# Patient Record
Sex: MALE | Race: WHITE | NOT HISPANIC OR LATINO | Employment: FULL TIME | URBAN - METROPOLITAN AREA
[De-identification: names, ages, dates, MRNs, and addresses within clinical notes are randomized per-mention and may not be internally consistent; named-entity substitution may affect disease eponyms.]

---

## 2017-03-09 ENCOUNTER — TELEPHONE (OUTPATIENT)
Dept: FAMILY MEDICINE | Facility: CLINIC | Age: 37
End: 2017-03-09

## 2017-03-09 NOTE — TELEPHONE ENCOUNTER
Reason for call:  Patient reporting a symptom    Symptom or request: Abdominal pain.    Duration (how long have symptoms been present): NA    Have you been treated for this before? No    Additional comments: Patient is having sharp abdominal pain.     Phone Number patient can be reached at:  Home number on file [phone] (home) or Cell number on file:    Telephone Information:   Mobile [phone]       Best Time:  any    Can we leave a detailed message on this number:  NO    Call taken on 3/9/2017 at 10:42 AM by Marlee Alvarado

## 2017-03-09 NOTE — TELEPHONE ENCOUNTER
Crow Castrejon is a 36 year old male  who calls with abdominal pain.    NURSING ASSESSMENT:  The pain began 3 days ago.  Lasts about a few seconds each time. About 3-4 episodes a day   Pain scale (0-10):  LMP  was  n/a.  The pain is described as sharp and is located LLQ, which is without radiation.  Symptom associated with the abdominal pain: none.  Patient has reports that he had an abd hernia as baby (unsure of specifics) but has a scar on his abd from it (surgery?)  Pain is aggravated by coughing or certain movements, and relieved by certain positions/movements.  Allergies: No Known Allergies    MEDICATIONS:   Taking medication(s) as prescribed? N/A  Taking over the counter medication(s)? N/A  Any medication side effects? Not Applicable    Any barriers to taking medication(s) as prescribed?  N/A  Medication(s) improving/managing symptoms?  N/A  Medication reconciliation completed: N/A    NURSING PLAN: Nursing advice to patient to be seen for further eval. Has already made an appointment for tomorrow. Unable to find open appts for today for clinic locations requested. He stated that he will just keep his appointment for tomorrow and go to  if symptoms worsens and unable to wait    RECOMMENDED DISPOSITION:  see above  Will comply with recommendation: Yes  If further questions/concerns or if symptoms do not improve, worsen or new symptoms develop, call your PCP or [facility] as soon as possible.    Guideline used:  Telephone Triage Protocols for Nurses, Fourth Edition, Charlotte Espinoza RN

## 2017-03-10 ENCOUNTER — OFFICE VISIT (OUTPATIENT)
Dept: FAMILY MEDICINE | Facility: CLINIC | Age: 37
End: 2017-03-10
Payer: COMMERCIAL

## 2017-03-10 VITALS
OXYGEN SATURATION: 99 % | WEIGHT: 200.4 LBS | DIASTOLIC BLOOD PRESSURE: 78 MMHG | HEART RATE: 69 BPM | TEMPERATURE: 98.2 F | BODY MASS INDEX: 24.72 KG/M2 | SYSTOLIC BLOOD PRESSURE: 129 MMHG

## 2017-03-10 DIAGNOSIS — S39.011A STRAIN OF ABDOMINAL MUSCLE, INITIAL ENCOUNTER: ICD-10-CM

## 2017-03-10 DIAGNOSIS — R10.32 LLQ ABDOMINAL PAIN: Primary | ICD-10-CM

## 2017-03-10 LAB
BASOPHILS # BLD AUTO: 0 10E9/L (ref 0–0.2)
BASOPHILS NFR BLD AUTO: 0.4 %
DIFFERENTIAL METHOD BLD: NORMAL
EOSINOPHIL # BLD AUTO: 0.1 10E9/L (ref 0–0.7)
EOSINOPHIL NFR BLD AUTO: 2.4 %
ERYTHROCYTE [DISTWIDTH] IN BLOOD BY AUTOMATED COUNT: 12.4 % (ref 10–15)
HCT VFR BLD AUTO: 45.6 % (ref 40–53)
HGB BLD-MCNC: 14.9 G/DL (ref 13.3–17.7)
LYMPHOCYTES # BLD AUTO: 1.7 10E9/L (ref 0.8–5.3)
LYMPHOCYTES NFR BLD AUTO: 37.1 %
MCH RBC QN AUTO: 29 PG (ref 26.5–33)
MCHC RBC AUTO-ENTMCNC: 32.7 G/DL (ref 31.5–36.5)
MCV RBC AUTO: 89 FL (ref 78–100)
MONOCYTES # BLD AUTO: 0.6 10E9/L (ref 0–1.3)
MONOCYTES NFR BLD AUTO: 12.6 %
NEUTROPHILS # BLD AUTO: 2.2 10E9/L (ref 1.6–8.3)
NEUTROPHILS NFR BLD AUTO: 47.5 %
PLATELET # BLD AUTO: 177 10E9/L (ref 150–450)
RBC # BLD AUTO: 5.13 10E12/L (ref 4.4–5.9)
WBC # BLD AUTO: 4.5 10E9/L (ref 4–11)

## 2017-03-10 PROCEDURE — 36415 COLL VENOUS BLD VENIPUNCTURE: CPT | Performed by: NURSE PRACTITIONER

## 2017-03-10 PROCEDURE — 85025 COMPLETE CBC W/AUTO DIFF WBC: CPT | Performed by: NURSE PRACTITIONER

## 2017-03-10 PROCEDURE — 99213 OFFICE O/P EST LOW 20 MIN: CPT | Performed by: NURSE PRACTITIONER

## 2017-03-10 RX ORDER — CYCLOBENZAPRINE HCL 10 MG
5-10 TABLET ORAL 3 TIMES DAILY PRN
Qty: 30 TABLET | Refills: 0 | Status: SHIPPED | OUTPATIENT
Start: 2017-03-10 | End: 2018-08-20

## 2017-03-10 NOTE — PATIENT INSTRUCTIONS
Try alternating Tylenol and ibuprofen to see if that improves the symptoms. Additionally, you can try the muscle relaxers to see if this improves the pain. If it does, you can hold off on the ultrasound. If it does not, have the ultrasound done.  I will let you know your lab results and imaging results.  Follow up if your symptoms persist or worsen.       Groin Strain (Adult)    A groin strain is a stretching or partial tearing of the muscle in the lower abdomen or upper thigh. This may happen because of too much coughing, heavy lifting, or active sports. The pain may last for several days to weeks, depending on how bad the stretch or tear is. It will generally get better with rest, ice, and anti-inflammatory medicines.  A groin strain can lead to a groin hernia. This is also called an inguinal hernia. A hernia is a complete tear of the abdominal muscle. This allows fat or the intestines to bulge out and create a visible bump just above the thigh crease. This is a more serious problem and may need surgery to repair it. When you lie down, the bump should get smaller or disappear completely. If it doesn t, and you are not able to flatten it with your hand, you need medical attention right away.  Home care    Avoid heavy lifting, straining, or any activities that cause groin pain.    You may use over-the-counter pain medicine to control pain, unless another pain medicine was prescribed. If you have chronic liver or kidney disease or ever had a stomach ulcer or GI bleeding, talk with your healthcare provider before using these medicines.  Follow-up care  Follow up with your healthcare provider, or as advised. Make an appointment with your healthcare provider if you develop a bump in the area of the groin strain.  When to seek medical advice  Call your healthcare provider right away if any of these occur:    Increasing pain in the area of the groin strain    Tender bump just above the groin crease that does not flatten  when you lie down or press on it    Overall abdominal swelling or pain    Fever of 100.4 F (38 C) or above lasting for 24 to 48 hours    Repeated vomiting    Pain that moves to the lower right abdomen, just below the waistline, or spreads to the back    1452-2276 [employer]. [address] [place] [address]. All rights reserved. This information is not intended as a substitute for professional medical care. Always follow your healthcare professional's instructions.

## 2017-03-10 NOTE — NURSING NOTE
"Chief Complaint   Patient presents with     Abdominal Pain       Initial /78  Pulse 69  Temp 98.2  F (36.8  C) (Oral)  Wt 200 lb 6.4 oz (90.9 kg)  SpO2 99%  BMI 24.72 kg/m2 Estimated body mass index is 24.72 kg/(m^2) as calculated from the following:    Height as of 7/8/16: 6' 3.5\" (1.918 m).    Weight as of this encounter: 200 lb 6.4 oz (90.9 kg).  Medication Reconciliation: complete     Venancio Hoffman CMA    "

## 2017-03-10 NOTE — PROGRESS NOTES
SUBJECTIVE:                                                    Crow Castrejon is a 36 year old male who presents to clinic today for the following health issues:      Abdominal Pain      Duration: x4-5 days    Description (location/character/radiation): Lower Left abdomen       Associated flank pain: none    Intensity:  Moderate, intermittent    Accompanying signs and symptoms: Was playing volleyball Monday, does not know of injury, pain noticed Tuesday am.       Fever/Chills: no        Gas/Bloating: no        Nausea/vomitting: no        Diarrhea: no        Dysuria or Hematuria: no     History (previous similar pain/trauma/previous testing): none    Precipitating or alleviating factors:       Pain worse with eating/BM/urination: none       Pain relieved by BM: no ; no change in BMs, not family hx of chrons, colitis, colon ca       Pain worse with coughing , lifting.     Therapies tried and outcome: None    LMP:  not applicable       Nathaniel did have a hernia when he was a baby.    Problem list and histories reviewed & adjusted, as indicated.  Additional history: as documented    Patient Active Problem List   Diagnosis     CARDIOVASCULAR SCREENING; LDL GOAL LESS THAN 160     Snoring     DIAMOND (obstructive sleep apnea)-moderate (AHI 24)     Past Surgical History   Procedure Laterality Date     Surgical history of -   2010     lipoma excision neck area     Fracture tx, finger/hand  1994     Fracture TX Finger,Hand       Social History   Substance Use Topics     Smoking status: Former Smoker     Quit date: 6/1/2010     Smokeless tobacco: Never Used     Alcohol use Yes      Comment: occasional     Family History   Problem Relation Age of Onset     Thyroid Disease Father      graves Disease     Breast Cancer Paternal Grandmother          Current Outpatient Prescriptions   Medication Sig Dispense Refill     cyclobenzaprine (FLEXERIL) 10 MG tablet Take 0.5-1 tablets (5-10 mg) by mouth 3 times daily as needed for muscle  spasms 30 tablet 0     ORDER FOR [facility] Respironics REMSTAR 60 Series Auto CPAP 6cm H2O, Howell Fx mask       No Known Allergies  BP Readings from Last 3 Encounters:   03/10/17 129/78   08/23/16 118/70   07/08/16 136/82    Wt Readings from Last 3 Encounters:   03/10/17 200 lb 6.4 oz (90.9 kg)   08/23/16 194 lb (88 kg)   07/08/16 195 lb 6.4 oz (88.6 kg)            Labs reviewed in EPIC    Reviewed and updated as needed this visit by clinical staff  Tobacco  Allergies  Meds  Med Hx  Surg Hx  Fam Hx  Soc Hx      Reviewed and updated as needed this visit by Provider         ROS:  Constitutional, HEENT, cardiovascular, pulmonary, GI, , musculoskeletal, neuro, skin, endocrine and psych systems are negative, except as otherwise noted.    OBJECTIVE:                                                    /78  Pulse 69  Temp 98.2  F (36.8  C) (Oral)  Wt 200 lb 6.4 oz (90.9 kg)  SpO2 99%  BMI 24.72 kg/m2  Body mass index is 24.72 kg/(m^2).  GENERAL: healthy, alert and no distress  RESP: lungs clear to auscultation - no rales, rhonchi or wheezes  CV: regular rate and rhythm, normal S1 S2, no S3 or S4, no murmur, click or rub, no peripheral edema and peripheral pulses strong  ABDOMEN: soft, no hepatosplenomegaly, no masses and bowel sounds normal  POSITIVE for LLQ tenderness with palpation  MS: no gross musculoskeletal defects noted, no edema  SKIN: no suspicious lesions or rashes  NEURO: A&O, mentation intact and speech normal    Diagnostic Test Results:  See orders     ASSESSMENT/PLAN:                                                          ICD-10-CM    1. LLQ abdominal pain R10.32 CBC with platelets and differential     US Abdomen Complete   2. Strain of abdominal muscle, initial encounter S39.011A cyclobenzaprine (FLEXERIL) 10 MG tablet       See Patient Instructions    FAUZIA Arauz  [facility]

## 2017-03-10 NOTE — MR AVS SNAPSHOT
After Visit Summary   3/10/2017    Crow Castrejon    MRN: [id number]           Patient Information     Date Of Birth          1980        Visit Information        Provider Department      3/10/2017 10:20 AM Judy Rodriguez NP [facility]        Today's Diagnoses     LLQ abdominal pain    -  1    Strain of abdominal muscle, initial encounter          Care Instructions    Try alternating Tylenol and ibuprofen to see if that improves the symptoms. Additionally, you can try the muscle relaxers to see if this improves the pain. If it does, you can hold off on the ultrasound. If it does not, have the ultrasound done.  I will let you know your lab results and imaging results.  Follow up if your symptoms persist or worsen.       Groin Strain (Adult)    A groin strain is a stretching or partial tearing of the muscle in the lower abdomen or upper thigh. This may happen because of too much coughing, heavy lifting, or active sports. The pain may last for several days to weeks, depending on how bad the stretch or tear is. It will generally get better with rest, ice, and anti-inflammatory medicines.  A groin strain can lead to a groin hernia. This is also called an inguinal hernia. A hernia is a complete tear of the abdominal muscle. This allows fat or the intestines to bulge out and create a visible bump just above the thigh crease. This is a more serious problem and may need surgery to repair it. When you lie down, the bump should get smaller or disappear completely. If it doesn t, and you are not able to flatten it with your hand, you need medical attention right away.  Home care    Avoid heavy lifting, straining, or any activities that cause groin pain.    You may use over-the-counter pain medicine to control pain, unless another pain medicine was prescribed. If you have chronic liver or kidney disease or ever had a stomach ulcer or GI bleeding, talk with your healthcare  provider before using these medicines.  Follow-up care  Follow up with your healthcare provider, or as advised. Make an appointment with your healthcare provider if you develop a bump in the area of the groin strain.  When to seek medical advice  Call your healthcare provider right away if any of these occur:    Increasing pain in the area of the groin strain    Tender bump just above the groin crease that does not flatten when you lie down or press on it    Overall abdominal swelling or pain    Fever of 100.4 F (38 C) or above lasting for 24 to 48 hours    Repeated vomiting    Pain that moves to the lower right abdomen, just below the waistline, or spreads to the back    [id number] [employer]. [address] [place] [address], [place] [address]. All rights reserved. This information is not intended as a substitute for professional medical care. Always follow your healthcare professional's instructions.              Follow-ups after your visit        Follow-up notes from your care team     Return if symptoms worsen or fail to improve.      Future tests that were ordered for you today     Open Future Orders        Priority Expected Expires Ordered    US Abdomen Complete Routine 6/8/2017 3/10/2018 3/10/2017            Who to contact     Normal or non-critical lab and imaging results will be communicated to you by [employer], letter or phone within 4 business days after the clinic has received the results. If you do not hear from us within 7 days, please contact the clinic through [employer] or phone. If you have a critical or abnormal lab result, we will notify you by phone as soon as possible.  Submit refill requests through [employer] or call your pharmacy and they will forward the refill request to us. Please allow 3 business days for your refill to be completed.          If you need to speak with a  for additional information , please call: [phone]             Additional Information About Your  Visit        MyChart Information     [employer] gives you secure access to your electronic health record. If you see a primary care provider, you can also send messages to your care team and make appointments. If you have questions, please call your primary care clinic.  If you do not have a primary care provider, please call [phone] and they will assist you.        Care EveryWhere ID     This is your Care EveryWhere ID. This could be used by other organizations to access your [facility] medical records  [id number]        Your Vitals Were     Pulse Temperature Pulse Oximetry BMI (Body Mass Index)          69 98.2  F (36.8  C) (Oral) 99% 24.72 kg/m2         Blood Pressure from Last 3 Encounters:   03/10/17 129/78   08/23/16 118/70   07/08/16 136/82    Weight from Last 3 Encounters:   03/10/17 200 lb 6.4 oz (90.9 kg)   08/23/16 194 lb (88 kg)   07/08/16 195 lb 6.4 oz (88.6 kg)              We Performed the Following     CBC with platelets and differential          Today's Medication Changes          These changes are accurate as of: 3/10/17 10:40 AM.  If you have any questions, ask your nurse or doctor.               Start taking these medicines.        Dose/Directions    cyclobenzaprine 10 MG tablet   Commonly known as:  FLEXERIL   Used for:  Strain of abdominal muscle, initial encounter   Started by:  Judy Rodriguez NP        Dose:  5-10 mg   Take 0.5-1 tablets (5-10 mg) by mouth 3 times daily as needed for muscle spasms   Quantity:  30 tablet   Refills:  0            Where to get your medicines      These medications were sent to [facility] - [address] [facility]  [address] [facility] [place] [address]     Phone:  [phone]     cyclobenzaprine 10 MG tablet                Primary Care Provider Office Phone # Fax #    Irina Andres PA-C [phone]       [facility] [address] [place] [address] DR ERIK THOMAS [id number]        Thank you!     Thank you for choosing  [facility]  for your care. Our goal is always to provide you with excellent care. Hearing back from our patients is one way we can continue to improve our services. Please take a few minutes to complete the written survey that you may receive in the mail after your visit with us. Thank you!             Your Updated Medication List - Protect others around you: Learn how to safely use, store and throw away your medicines at www.disposemymeds.org.          This list is accurate as of: 3/10/17 10:40 AM.  Always use your most recent med list.                   Brand Name Dispense Instructions for use    cyclobenzaprine 10 MG tablet    FLEXERIL    30 tablet    Take 0.5-1 tablets (5-10 mg) by mouth 3 times daily as needed for muscle spasms       order for [facility]      Respironics REMSTAR 60 Series Auto CPAP 6cm H2O, Howell Fx mask

## 2018-08-20 ENCOUNTER — OFFICE VISIT (OUTPATIENT)
Dept: SLEEP MEDICINE | Facility: CLINIC | Age: 38
End: 2018-08-20
Payer: COMMERCIAL

## 2018-08-20 VITALS
BODY MASS INDEX: 24.48 KG/M2 | WEIGHT: 201 LBS | HEART RATE: 73 BPM | HEIGHT: 76 IN | SYSTOLIC BLOOD PRESSURE: 132 MMHG | DIASTOLIC BLOOD PRESSURE: 79 MMHG | OXYGEN SATURATION: 98 %

## 2018-08-20 DIAGNOSIS — G47.33 OSA (OBSTRUCTIVE SLEEP APNEA): Primary | ICD-10-CM

## 2018-08-20 PROCEDURE — 99203 OFFICE O/P NEW LOW 30 MIN: CPT | Performed by: PHYSICIAN ASSISTANT

## 2018-08-20 NOTE — PATIENT INSTRUCTIONS

## 2018-08-20 NOTE — NURSING NOTE
"Chief Complaint   Patient presents with     Sleep Problem     consult-get new supplies       Initial /79  Pulse 73  Ht 1.93 m (6' 4\")  Wt 91.2 kg (201 lb)  SpO2 98%  BMI 24.47 kg/m2 Estimated body mass index is 24.47 kg/(m^2) as calculated from the following:    Height as of this encounter: 1.93 m (6' 4\").    Weight as of this encounter: 91.2 kg (201 lb).    Medication Reconciliation: complete    Neck circumference: 14.75 inches / 37.5 centimeters.    DME:[facility]  "

## 2018-08-20 NOTE — PROGRESS NOTES
Sleep Consultation:    Date on this visit: 8/20/2018    Crow Castrejon is sent by No ref. provider found for a sleep consultation .    Primary Physician: Irina Andres     Chief Complaint   Patient presents with     Sleep Problem     consult-get new supplies     Crow Castrejon initially presented [facility] withconcerns of snoring, witnessed apnea and non-restorative sleep. He was recommended to undergo a sleep study.    Polysomnogram 1/21/2013: (168.4 lbs)- Baseline oxygen saturation was 96.0%. Snoring was reported as loud. Lowest oxygen saturation was 85.0% The combined Apnea/Hypopnea Index was 24.3 events per hour. The REM AHI is 36.7. The supine AHI is 24.3. The RERA index was 9.5 per hour. The RDI was 33.8.     Titration Polysomnography 2/12/2013(weighs 168.4 lbs)- The patient was titrated at pressures ranging from 5.0 cm/H20 up to 6.0 cm/H20. The optimal pressure achieved was 6.0 cm/H20 with an AHI of 0.5 including REM supine.    He was diagnosed with moderate obstructive sleep apnea and prescribed CPAP 6 cm/H20. He stopped CPAP in 2014 after meeting his girlfriend who did not mind him snoring. He states he wants to start CPAP again because of return of daytime sleepiness.     He has gained ~20 lbs since his last sleep study.     Crow goes to sleep at 11:00 PM during the week. He wakes up at 6:00 AM with an alarm. He falls asleep in 5 minutes.  Crow denies difficulty falling asleep.  He wakes up 2-3 times a night for 5 minutes before falling back to sleep.  Crow wakes up to uncertain reasons.  On weekends, Crow goes to sleep at 12:00 AM.  He wakes up at 8:00 AM without an alarm. He falls asleep in 5 minutes.  Patient gets 5-6 hours of sleep per night.     Patient does watch TV in bed and does not use electronics in bed and read in bed.     Crow does not do shift work.      Crow does snore every night and snoring is loud. Patient does have a regular bed  partner. There is report of snoring.  He does have witnessed apneas. They never sleep separately.  Patient sleeps on his back and side. He denies no morning headaches, morning confusion and restless legs. Crow denies any bruxism, sleep walking, sleep talking, dream enactment, sleep paralysis, cataplexy and hypnogogic/hypnopompic hallucinations.    Crow denies difficulty breathing through his nose, claustrophobia and reflux at night.      Patient describes themself as a morning person.  He would prefer to go to sleep at 10:00 PM and wake up at 6:00 AM.  Patient's Neely Sleepiness score 4/24 inconsistent with excessive  daytime sleepiness.  He has fatigue.     Crow does not take naps.  He takes some inadvertant naps.  He denies falling asleep while driving.  Patient was counseled on the importance of driving while alert, to pull over if drowsy, or nap before getting into the vehicle if sleepy.    Allergies:    No Known Allergies    Medications:    Current Outpatient Prescriptions   Medication Sig Dispense Refill     ORDER FOR [facility] Respironics REMSTAR 60 Series Auto CPAP 6cm H2O, Howell Fx mask         Problem List:  Patient Active Problem List    Diagnosis Date Noted     DIAMOND (obstructive sleep apnea)-moderate (AHI 24) 01/22/2013     Priority: Medium     Indications for titration Polysomnography 2/12/2013(weighs 168.4 lbs)- The patient was titrated at pressures ranging from 5.0 cm/H20 up to 6.0 cm/H20.  The optimal pressure achieved was 6.0 cm/H20 with an AHI of 0.5 including REM supine Indications for Polysomnogram 1/21/2013: (168.4 lbs)- Baseline oxygen saturation was 96.0%.  Snoring was reported as loud.  Lowest oxygen saturation was 85.0% The combined Apnea/Hypopnea Index was 24.3 events per hour.  The REM AHI is 36.7.  The supine AHI is 24.3.  The RERA index was 9.5 per hour.   The RDI was 33.8 .           CARDIOVASCULAR SCREENING; LDL GOAL LESS THAN 160 12/03/2012     Priority: Medium     Snoring 12/03/2012      "Priority: Medium        Past Medical/Surgical History:  Past Medical History:   Diagnosis Date     NO ACTIVE PROBLEMS      DIAMOND (obstructive sleep apnea)-moderate (AHI 24) 1/22/2013    Indications for Polysomnogram 1/21/2013:  The patient is a 32 y year old Male who is 6' 4\" and weighs 168.4 lbs.  His BMI equals 20.5, Russell sleepiness scale 2.0 and neck size is 13.5.  A full night polysomnogram was performed to evaluate for snoring, witnessed apneas, and possible DIAMOND.   Polysomnogram Data:  A full night polysomnogram recorded the standard physiologic parameters including EEG,      Past Surgical History:   Procedure Laterality Date     FRACTURE TX, FINGER/HAND  1994    Fracture TX Finger,Hand     SURGICAL HISTORY OF -   2010    lipoma excision neck area       Social History:  Social History     Social History     Marital status: Single     Spouse name: N/A     Number of children: 1     Years of education: N/A     Occupational History      [employer]     Social History Main Topics     Smoking status: Former Smoker     Quit date: 6/1/2010     Smokeless tobacco: Never Used     Alcohol use Yes      Comment: occasional     Drug use: No     Sexual activity: Yes     Partners: Female     Other Topics Concern     Parent/Sibling W/ Cabg, Mi Or Angioplasty Before 65f 55m? No     Social History Narrative       Family History:  Family History   Problem Relation Age of Onset     Thyroid Disease Father      graves Disease     Breast Cancer Paternal Grandmother        Review of Systems:  CONSTITUTIONAL: NEGATIVE for weight gain/loss, fever, chills, sweats or night sweats, drug allergies.  EYES: NEGATIVE for changes in vision, blind spots, double vision.  ENT: NEGATIVE for ear pain, sore throat, sinus pain, post-nasal drip, runny nose, bloody nose  CARDIAC: NEGATIVE for fast heartbeats or fluttering in chest, chest pain or pressure, breathlessness when lying flat, swollen legs or swollen feet.  NEUROLOGIC: NEGATIVE headaches, " "weakness or numbness in the arms or legs.  DERMATOLOGIC: NEGATIVE for rashes, new moles or change in mole(s)  PULMONARY: NEGATIVE SOB at rest, SOB with activity, dry cough, productive cough, coughing up blood, wheezing or whistling when breathing.    GASTROINTESTINAL: NEGATIVE for nausea or vomitting, loose or watery stools, fat or grease in stools, constipation, abdominal pain, bowel movements black in color or blood noted.  GENITOURINARY: NEGATIVE for pain during urination, blood in urine, urinating more frequently than usual.  MUSCULOSKELETAL: NEGATIVE for muscle pain, bone or joint pain, swollen joints.  ENDOCRINE: NEGATIVE for increased thirst or urination, diabetes.  LYMPHATIC: NEGATIVE for swollen lymph nodes, lumps or bumps in the breasts or nipple discharge.    Physical Examination:  Vitals: Ht 1.93 m (6' 4\")  Wt 91.2 kg (201 lb)  BMI 24.47 kg/m2  BMI= Body mass index is 24.47 kg/(m^2).         [place] Total Score 4/17/2013   Total score - [place] 3            GENERAL APPEARANCE: alert and no distress  EYES: Eyes grossly normal to inspection, PERRL and conjunctivae and sclerae normal  HENT: oropharynx crowded  NECK: no asymmetry, masses, or scars  MS: extremities normal- no gross deformities noted  NEURO: Normal strength and tone, mentation intact and speech normal  PSYCH: mentation appears normal and affect normal/bright    Impression/Plan:  History of moderate obstructive sleep apnea, currently untreated. 20# weight gain since his sleep study.  Today, we discussed treatment options including an all night PAP titration and mandibular advancement device. He wants to continue on CPAP.   Re-initiate CPAP but set to atuo 6-10 cm/H2O. He will meet with the DME provider for mask fitting and education on how to clean his CPAP.     He will follow up with me in approximately 3 months.        Obstructive sleep apnea reviewed.  Complications of untreated sleep apnea were reviewed.    Berny Choi PA-C    CC: No " ref. provider found

## 2018-08-20 NOTE — MR AVS SNAPSHOT
After Visit Summary   8/20/2018    Crow Castrejon    MRN: [id number]           Patient Information     Date Of Birth          1980        Visit Information        Provider Department      8/20/2018 2:40 PM Berny Choi PA [facility]        Today's Diagnoses     DIAMOND (obstructive sleep apnea)-moderate (AHI 24)    -  1      Care Instructions      Your BMI is Body mass index is 24.47 kg/(m^2).  Weight management is a personal decision.  If you are interested in exploring weight loss strategies, the following discussion covers the approaches that may be successful. Body mass index (BMI) is one way to tell whether you are at a healthy weight, overweight, or obese. It measures your weight in relation to your height.  A BMI of 18.5 to 24.9 is in the healthy range. A person with a BMI of 25 to 29.9 is considered overweight, and someone with a BMI of 30 or greater is considered obese. More than two-thirds of American adults are considered overweight or obese.  Being overweight or obese increases the risk for further weight gain. Excess weight may lead to heart disease and diabetes.  Creating and following plans for healthy eating and physical activity may help you improve your health.  Weight control is part of healthy lifestyle and includes exercise, emotional health, and healthy eating habits. Careful eating habits lifelong are the mainstay of weight control. Though there are significant health benefits from weight loss, long-term weight loss with diet alone may be very difficult to achieve- studies show long-term success with dietary management in less than 10% of people. Attaining a healthy weight may be especially difficult to achieve in those with severe obesity. In some cases, medications, devices and surgical management might be considered.  What can you do?  If you are overweight or obese and are interested in methods for weight loss, you should discuss this with your  provider.     Consider reducing daily calorie intake by 500 calories.     Keep a food journal.     Avoiding skipping meals, consider cutting portions instead.    Diet combined with exercise helps maintain muscle while optimizing fat loss. Strength training is particularly important for building and maintaining muscle mass. Exercise helps reduce stress, increase energy, and improves fitness. Increasing exercise without diet control, however, may not burn enough calories to loose weight.       Start walking three days a week 10-20 minutes at a time    Work towards walking thirty minutes five days a week     Eventually, increase the speed of your walking for 1-2 minutes at time    In addition, we recommend that you review healthy lifestyles and methods for weight loss available through the National Institutes of Health patient information sites:  http://win.niddk.nih.gov/publications/index.htm    And look into health and wellness programs that may be available through your health insurance provider, employer, local community center, or abel club.                  Follow-ups after your visit        Follow-up notes from your care team     Return in about 3 months (around 11/20/2018).      Your next 10 appointments already scheduled     Dec 03, 2018  3:20 PM CST   Return Sleep Patient with RAHEL Loomis   [facility] ([facility])    [address] [place] [id number] [phone]              Who to contact     If you have questions or need follow up information about today's clinic visit or your schedule please contact [facility] directly at [phone].  Normal or non-critical lab and imaging results will be communicated to you by MyChart, letter or phone within 4 business days after the clinic has received the results. If you do not hear from us within 7 days, please contact the clinic through MyChart or phone. If you have a  "critical or abnormal lab result, we will notify you by phone as soon as possible.  Submit refill requests through [employer] or call your pharmacy and they will forward the refill request to us. Please allow 3 business days for your refill to be completed.          Additional Information About Your Visit        [employer] Information     [employer] gives you secure access to your electronic health record. If you see a primary care provider, you can also send messages to your care team and make appointments. If you have questions, please call your primary care clinic.  If you do not have a primary care provider, please call [phone] and they will assist you.        Care EveryWhere ID     This is your Care EveryWhere ID. This could be used by other organizations to access your [place] medical records  [id number]        Your Vitals Were     Pulse Height Pulse Oximetry BMI (Body Mass Index)          73 1.93 m (6' 4\") 98% 24.47 kg/m2         Blood Pressure from Last 3 Encounters:   08/20/18 132/79   03/10/17 129/78   08/23/16 118/70    Weight from Last 3 Encounters:   08/20/18 91.2 kg (201 lb)   03/10/17 90.9 kg (200 lb 6.4 oz)   08/23/16 88 kg (194 lb)              We Performed the Following     Comprehensive DME        Primary Care Provider Office Phone # Fax #    Irina Megan Andres PA-C [phone] [address] [place] [facility] [address]        Equal Access to Services     [facility]: Hadii aad ku hadasho Soomaali, waaxda luqadaha, qaybta kaalmada adeegyada, viviana buchanan. So [facility] [phone].    ATENCIÓN: Si habla español, tiene a thomas disposición servicios gratuitos de asistencia lingüística. Llame al [phone].    We comply with applicable federal civil rights laws and [place] laws. We do not discriminate on the basis of race, color, national origin, age, disability, sex, sexual orientation, or gender identity.            Thank you!     Thank you for choosing " [facility]  for your care. Our goal is always to provide you with excellent care. Hearing back from our patients is one way we can continue to improve our services. Please take a few minutes to complete the written survey that you may receive in the mail after your visit with us. Thank you!             Your Updated Medication List - Protect others around you: Learn how to safely use, store and throw away your medicines at www.disposemymeds.org.          This list is accurate as of 8/20/18  3:38 PM.  Always use your most recent med list.                   Brand Name Dispense Instructions for use Diagnosis    order for DME      Respironics REMSTAR 60 Series Auto CPAP 6cm H2O, Howell Fx mask

## 2018-11-21 NOTE — PROGRESS NOTES
Jesus Maria,    Thank you for your recent office visit.    Here are your recent results.  Normal lab results. Follow up if your symptoms persist or worsen.     Feel free to contact me via [employer] [employer] or call the clinic at [phone].    Sincerely,    NOEL Rodas, FNP-BC     21-Nov-2018 19:01

## 2019-06-18 ENCOUNTER — ANCILLARY PROCEDURE (OUTPATIENT)
Dept: GENERAL RADIOLOGY | Facility: CLINIC | Age: 39
End: 2019-06-18
Attending: PHYSICIAN ASSISTANT
Payer: COMMERCIAL

## 2019-06-18 ENCOUNTER — OFFICE VISIT (OUTPATIENT)
Dept: FAMILY MEDICINE | Facility: CLINIC | Age: 39
End: 2019-06-18
Payer: COMMERCIAL

## 2019-06-18 VITALS
WEIGHT: 203 LBS | SYSTOLIC BLOOD PRESSURE: 124 MMHG | HEIGHT: 76 IN | HEART RATE: 70 BPM | RESPIRATION RATE: 14 BRPM | BODY MASS INDEX: 24.72 KG/M2 | DIASTOLIC BLOOD PRESSURE: 83 MMHG | TEMPERATURE: 98.1 F

## 2019-06-18 DIAGNOSIS — M79.645 PAIN OF FINGER OF LEFT HAND: Primary | ICD-10-CM

## 2019-06-18 DIAGNOSIS — M79.645 PAIN OF FINGER OF LEFT HAND: ICD-10-CM

## 2019-06-18 PROCEDURE — 73140 X-RAY EXAM OF FINGER(S): CPT | Mod: LT

## 2019-06-18 PROCEDURE — 99213 OFFICE O/P EST LOW 20 MIN: CPT | Performed by: PHYSICIAN ASSISTANT

## 2019-06-18 ASSESSMENT — PAIN SCALES - GENERAL: PAINLEVEL: NO PAIN (0)

## 2019-06-18 ASSESSMENT — MIFFLIN-ST. JEOR: SCORE: 1947.3

## 2019-06-18 NOTE — PROGRESS NOTES
"Subjective     Crow Castrejon is a 39 year old male who presents to clinic today for the following health issues:    HPI   Joint Pain    Onset: 1 week     Description:   Location: Left hand   Character: Sharp with movements     Intensity: moderate    Progression of Symptoms: intermittent    Accompanying Signs & Symptoms:  Other symptoms: swelling in the beginning, went away and now swelling is coming back     History:   Previous similar pain: He broke a finger on that hand a year before, wrist problems        Precipitating factors:   Trauma or overuse: YES- jammed it into the ground playing softball     Alleviating factors:  Improved by: nothing    Therapies Tried and outcome: Ice, Ibuprofen, Aspirin, Wrist brace that he has had from a previous injury     Fell on gloved hand while playing baseball on Wed  Had pain immediately - hard to get glove on and off but admits it wasn't bad enough that he stopped playing  Did take the next night off from volleyball but went golfing on Friday and said it \"felt great\"  Sat, sun and yesterday though it has been sore  Notices pain most with \"pulling\" movements to the thumb - ie. Difficult to open up ziplock bags or pressing down with his thumb and then extending cause discomfort across the PIP joint        Current Outpatient Medications   Medication Sig Dispense Refill     ORDER FOR DME Respironics REMSTAR 60 Series Auto CPAP 6cm H2O, Howell Fx mask       No Known Allergies      Reviewed and updated as needed this visit by Provider         Review of Systems   Remainder of ROS obtained and found to be negative other than that which was documented above        Objective    /83   Pulse 70   Temp 98.1  F (36.7  C) (Tympanic)   Resp 14   Ht 1.93 m (6' 4\")   Wt 92.1 kg (203 lb)   BMI 24.71 kg/m    Body mass index is 24.71 kg/m .  Physical Exam   GENERAL: healthy, alert and no distress  HAND/WRIST, left:   Nontender to palpation over the wrist  Normal ROM without " pain    Nontender to palpation over base of thumb   Normal ROM in thumb with some discomfort vs pain noted in flexion of thumb  Swelling and bruising noted over the PIP joint of thumb but not overly tender to palpation    Diagnostic Test Results:  Xray, left: no acute pathology noted on exam pending final read by radiology. Reviewed with patient in clinic        Assessment & Plan     (M79.645) Pain of finger of left hand  (primary encounter diagnosis)  Comment: no acute fracture. Likely tendon injury - discussed conservative treatment with rest, avoidance of aggravating activities and so forth. Expect slow but steady improvement  Plan: XR Finger Left G/E 2 Views              No follow-ups on file.    Lupe Magallon PA-C  [facility]

## 2019-09-13 ENCOUNTER — OFFICE VISIT (OUTPATIENT)
Dept: FAMILY MEDICINE | Facility: CLINIC | Age: 39
End: 2019-09-13
Payer: COMMERCIAL

## 2019-09-13 VITALS
BODY MASS INDEX: 24.69 KG/M2 | DIASTOLIC BLOOD PRESSURE: 78 MMHG | RESPIRATION RATE: 12 BRPM | WEIGHT: 202.8 LBS | HEART RATE: 60 BPM | TEMPERATURE: 96.8 F | SYSTOLIC BLOOD PRESSURE: 110 MMHG

## 2019-09-13 DIAGNOSIS — S29.012A STRAIN OF MID-BACK, INITIAL ENCOUNTER: Primary | ICD-10-CM

## 2019-09-13 PROCEDURE — 99213 OFFICE O/P EST LOW 20 MIN: CPT | Performed by: NURSE PRACTITIONER

## 2019-09-13 RX ORDER — NAPROXEN 500 MG/1
TABLET ORAL
Qty: 30 TABLET | Refills: 0 | Status: SHIPPED | OUTPATIENT
Start: 2019-09-13

## 2019-09-13 ASSESSMENT — ENCOUNTER SYMPTOMS
EYE DISCHARGE: 0
VOMITING: 0
SINUS PRESSURE: 0
SHORTNESS OF BREATH: 0
BACK PAIN: 1
WHEEZING: 0
FEVER: 0
DIARRHEA: 0
FATIGUE: 0
DIAPHORESIS: 0
NAUSEA: 0
SORE THROAT: 0
RHINORRHEA: 0
HEADACHES: 0
COUGH: 0

## 2019-09-13 ASSESSMENT — PAIN SCALES - GENERAL: PAINLEVEL: NO PAIN (0)

## 2019-09-13 NOTE — PROGRESS NOTES
Subjective     Crow Castrejon is a 39 year old male who presents to clinic today for the following health issues:    HPI     Back Pain       Duration: labor day weekend        Specific cause: fell onto board he uses to move jet ski    Description:   Location of pain: low back left  Character of pain: sharp, tightness, heard some popoing  Pain radiation:none  New numbness or weakness in legs, not attributed to pain:  no     Intensity: At it's worst 5/10    History:   Pain interferes with job: No  History of back problems: no prior back problems  Any previous MRI or X-rays: None  Sees a specialist for back pain:  No  Therapies tried without relief: ibuprofen    Alleviating factors:   Improved by: none      Precipitating factors:  Worsened by: playing volleyball, activity, deep breath, laying down      Accompanying Signs & Symptoms:  Risk of Fracture:  None  Risk of Cauda Equina:  None  Risk of Infection:  None  Risk of Cancer:  None  Risk of Ankylosing Spondylitis:  Onset at age <35, male, AND morning back stiffness. no        Current Outpatient Medications   Medication Sig Dispense Refill     naproxen (NAPROSYN) 500 MG tablet Take 1 pill twice per day with food for the next 10 days and then every 12 hours as needed 30 tablet 0     ORDER FOR [facility] Respironics REMSTAR 60 Series Auto CPAP 6cm H2O, Howell Fx mask       No Known Allergies    Reviewed and updated as needed this visit by Provider  Problems         Labor Day weekend, had made a jet ski left, and was pulling up and then ended up falling back onto board. Was very sore the first 2 days, then getting better. Now can feel and hear a pop in back. Played volleyball on Wed, no longer feeling popping, now feels very tight and sore. Most of the pain to left lower back. No pain to leg. No ice or heat. Has been taking ibuprofen 400 mg, 4 times in the last 2 weeks. No numbness or tingling. No previous history of back problems. No loss of control of bowel or bladder.        Objective    /78 (BP Location: Right arm, Patient Position: Sitting, Cuff Size: Adult Regular)   Pulse 60   Temp 96.8  F (36  C) (Tympanic)   Resp 12   Wt 92 kg (202 lb 12.8 oz)   BMI 24.69 kg/m    Body mass index is 24.69 kg/m .          Assessment & Plan      Review of Systems   Constitutional: Negative for diaphoresis, fatigue and fever.   HENT: Negative for congestion, ear pain, rhinorrhea, sinus pressure and sore throat.    Eyes: Negative for discharge.   Respiratory: Negative for cough, shortness of breath and wheezing.    Cardiovascular: Negative for chest pain.   Gastrointestinal: Negative for diarrhea, nausea and vomiting.   Musculoskeletal: Positive for back pain (mid left back pain).   Neurological: Negative for headaches.       Physical Exam   Constitutional: He appears well-developed and well-nourished.   HENT:   Head: Normocephalic and atraumatic.   Right Ear: Tympanic membrane and external ear normal. Tympanic membrane is not erythematous. No middle ear effusion.   Left Ear: Tympanic membrane and external ear normal. Tympanic membrane is not erythematous.  No middle ear effusion.   Nose: No mucosal edema or rhinorrhea.   Cardiovascular: Normal rate, regular rhythm and normal heart sounds.   Pulmonary/Chest: Effort normal and breath sounds normal. He has no wheezes.   Abdominal: Soft. Bowel sounds are normal.   Musculoskeletal:        Thoracic back: He exhibits tenderness and pain. He exhibits normal range of motion, no bony tenderness and no spasm.        Back:    Neurological: He is alert.   Skin: Skin is warm and dry.   Psychiatric: He has a normal mood and affect.         1. Strain of mid-back, initial encounter  Educated on use of mediation  Encouraged to alternate and ice and heat to back  Encouraged PHYSICAL THERAPY   Will set up for PHYSICAL THERAPY    Educated regarding warning signs to watch for and when would need to seek immediate medical attention.  - naproxen (NAPROSYN) 500  MG tablet; Take 1 pill twice per day with food for the next 10 days and then every 12 hours as needed  Dispense: 30 tablet; Refill: 0  - [facility], AND CHIROPRACTIC REFERRAL; Future       Return in about 2 weeks (around 9/27/2019), or if symptoms worsen or fail to improve.    NOEL Bai [facility]

## 2019-10-30 ENCOUNTER — AMBULATORY - HEALTHEAST (OUTPATIENT)
Dept: NURSING | Facility: CLINIC | Age: 39
End: 2019-10-30

## 2020-10-02 ENCOUNTER — AMBULATORY - HEALTHEAST (OUTPATIENT)
Dept: NURSING | Facility: CLINIC | Age: 40
End: 2020-10-02

## 2021-12-23 ENCOUNTER — IMMUNIZATION (OUTPATIENT)
Dept: NURSING | Facility: CLINIC | Age: 41
End: 2021-12-23
Payer: COMMERCIAL

## 2021-12-23 PROCEDURE — 91306 COVID-19,PF,MODERNA (18+ YRS BOOSTER .25ML): CPT

## 2021-12-23 PROCEDURE — 0064A COVID-19,PF,MODERNA (18+ YRS BOOSTER .25ML): CPT

## 2022-12-14 ENCOUNTER — ALLIED HEALTH/NURSE VISIT (OUTPATIENT)
Dept: FAMILY MEDICINE | Facility: CLINIC | Age: 42
End: 2022-12-14
Payer: COMMERCIAL

## 2022-12-14 DIAGNOSIS — Z23 HIGH PRIORITY FOR 2019-NCOV VACCINE: ICD-10-CM

## 2022-12-14 DIAGNOSIS — Z23 NEED FOR PROPHYLACTIC VACCINATION AND INOCULATION AGAINST INFLUENZA: ICD-10-CM

## 2022-12-14 PROCEDURE — 90471 IMMUNIZATION ADMIN: CPT

## 2022-12-14 PROCEDURE — 99207 PR NO CHARGE NURSE ONLY: CPT

## 2022-12-14 PROCEDURE — 91312 COVID-19 VACCINE BIVALENT BOOSTER 12+ (PFIZER): CPT

## 2022-12-14 PROCEDURE — 90686 IIV4 VACC NO PRSV 0.5 ML IM: CPT

## 2022-12-14 PROCEDURE — 0124A COVID-19 VACCINE BIVALENT BOOSTER 12+ (PFIZER): CPT

## 2023-01-15 ENCOUNTER — HEALTH MAINTENANCE LETTER (OUTPATIENT)
Age: 43
End: 2023-01-15

## 2023-05-15 ENCOUNTER — OFFICE VISIT (OUTPATIENT)
Dept: FAMILY MEDICINE | Facility: CLINIC | Age: 43
End: 2023-05-15
Payer: COMMERCIAL

## 2023-05-15 VITALS
TEMPERATURE: 97.5 F | DIASTOLIC BLOOD PRESSURE: 88 MMHG | OXYGEN SATURATION: 98 % | HEIGHT: 76 IN | SYSTOLIC BLOOD PRESSURE: 124 MMHG | RESPIRATION RATE: 14 BRPM | WEIGHT: 238.1 LBS | HEART RATE: 63 BPM | BODY MASS INDEX: 28.99 KG/M2

## 2023-05-15 DIAGNOSIS — Z30.2 ENCOUNTER FOR STERILIZATION: Primary | ICD-10-CM

## 2023-05-15 DIAGNOSIS — Z11.59 NEED FOR HEPATITIS C SCREENING TEST: ICD-10-CM

## 2023-05-15 DIAGNOSIS — Z11.4 SCREENING FOR HIV (HUMAN IMMUNODEFICIENCY VIRUS): ICD-10-CM

## 2023-05-15 PROCEDURE — 99212 OFFICE O/P EST SF 10 MIN: CPT | Performed by: FAMILY MEDICINE

## 2023-05-15 RX ORDER — DIAZEPAM 10 MG
10 TABLET ORAL EVERY 6 HOURS PRN
Qty: 1 TABLET | Refills: 0 | Status: SHIPPED | OUTPATIENT
Start: 2023-05-15 | End: 2023-12-18

## 2023-05-15 RX ORDER — OXYCODONE AND ACETAMINOPHEN 5; 325 MG/1; MG/1
1 TABLET ORAL EVERY 6 HOURS PRN
Qty: 12 TABLET | Refills: 0 | Status: SHIPPED | OUTPATIENT
Start: 2023-05-15 | End: 2023-12-18

## 2023-05-15 ASSESSMENT — PAIN SCALES - GENERAL: PAINLEVEL: NO PAIN (0)

## 2023-05-15 NOTE — PROGRESS NOTES
Assessment & Plan     Screening for HIV (human immunodeficiency virus)  Encounter for sterilization    - diazepam (VALIUM) 10 MG tablet; Take 1 tablet (10 mg) by mouth every 6 hours as needed for anxiety or sleep 1 tab 1 hour before procedure  - oxyCODONE-acetaminophen (PERCOCET) 5-325 MG tablet; Take 1 tablet by mouth e    SUBJECTIVE:  .eliseo comes in for a vasectomy consultation.  He and his partner have decided they don't want to have any more children. They have decided that he will have the sterilization procedure instead of her.  Patient was given information to read prior to the consultation.      We talked about the risks and benefits of the vasectomy; risks being that of potential for bleeding, infection, postoperative discomfort immediately which can last for a number of weeks afterwards, also the development of spermatoceles or sperm granulomas, epididymal cysts and the possibility of failure of the procedure producing failed attempt at sterility.     Also mentioned to the patient that there is some literature out there that suggests men who have vasectomies are at increased risk for prostate cancer; however, this literature is inconclusive and controversial.  It is recommended that men who have vasectomies should have annual prostate exams through the rectum yearly after age 40 and also may benefit from a screening prostatic specific antigen test after age 40.  We also discussed signs and symptoms of prostatic enlargement or prostatic problems.     I went through the procedure with the patient, how it is done, a midline incision in the scrotum measuring approximately 1/2 inch in length.  The vas deferens is brought up through this incision, dissected free and a small portion, maybe 0.5 cm. in length is removed.   cauterized and then the proximal or distal end is buried away from the other.  Patient had no questions when it came to the procedure itself.  I did show him pictures and diagrams of the  procedure.  I showed him where a potential spermatocele or sperm granuloma can occur.      Again, the patient had no further questions for me.    OBJECTIVE:  On exam, this is a well-developed, well-nourished white male.      Exam reveals a normal circumcised penis, no lesions.  Testes are descended bilaterally.  Exam was limited to the genitalia.  Both vas deferens were easily identified.  No other abnormalities were noted.      ASSESSMENT:  Undesired fertility in an adult male, requesting vasectomy.    PLAN:  He will schedule a vasectomy at his convenience for either the last appointment of the morning He is aware that he will need to take the entire weekend off and have essentially bedrest for two days with ice packs every two hours and ibuprofen for discomfort.  I gave him a prescription for ibuprofen 800 mg. to take every six to eight hours with food after the procedure.  He will take one tablet half an hour before the procedure along with Valium 10 mg.    If he has any further questions, he will contact me prior to the procedure or ask me on the day of the procedure.  He also is aware that he will need to bring a specimen after 10-12 ejaculations to make sure that he has cleared the storage vesicles of any residual sperm and to make sure that he is sterile.     Answers for HPI/ROS submitted by the patient on 5/12/2023  What is the reason for your visit today? : Consultation  How many servings of fruits and vegetables do you eat daily?: 0-1  On average, how many sweetened beverages do you drink each day (Examples: soda, juice, sweet tea, etc.  Do NOT count diet or artificially sweetened beverages)?: 2  How many minutes a day do you exercise enough to make your heart beat faster?: 20 to 29  How many days a week do you exercise enough to make your heart beat faster?: 5  How many days per week do you miss taking your medication?: 0

## 2024-01-03 NOTE — COMMUNITY RESOURCES LIST (ENGLISH)
01/03/2024   [facility]  N/A  For questions about this resource list or additional care needs, please contact your primary care clinic or care manager.  Phone: [phone]   Email: N/A   Address: [facility] [place] [address]   Hours: N/A        Hotlines and Helplines       Hotline - Housing crisis  1  [facility] Distance: 18.33 miles      Phone/Virtual   [address] [place] [address]  Language: English  Hours: Mon - Sun Open 24 Hours   Phone: (367) 276-6514 Email: communications@[facility].org Website: https://oscs-[place].org/oursaviourshousing/     2  [facility] Distance: 19.52 miles      Phone/Virtual   [address] [place] [address]  Language: English  Hours: Mon - Sun Open 24 Hours   Phone: (907) 436-4439 Email: info@[facility] Website: http://www.[facility].org          Housing       Coordinated Entry access point  3  [facility] [employer] [facility] Distance: 11.09 miles      Phone/Virtual   [address] [address]  Language: English  Hours: Mon - Fri 8:30 AM - 12:00 PM , Mon - Fri 1:00 PM - 4:00 PM  Fees: Free   Phone: (808) 905-2717 Ext.2 Email: deonte@[facility].[employer].org Website: https://www.GotoTelusa.org/usn/     4  [facility] Distance: 15.33 miles      In-Person, Phone/Virtual   [address] [place] [address]  Language: English  Hours: Mon - Fri 8:30 AM - 4:30 PM  Fees: Free   Phone: (941) 200-7911 Email: info@mnJoin The Players.org Website: https://www.[facility].org/[facility]/     Drop-in center or day shelter  5  [facility] Distance: 14.2 miles      In-Person   [address] [place] [address]  Language: English  Hours: Mon - Fri 9:00 AM - 4:00 PM  Fees: Free   Phone: (446) 520-2331 Email: fede@[employer].org Website: http://[employer].org     6  [facility]  Distance: 17.88 miles      In-Person   [address] [place] [address]  Language: English, Hmong, Colombian, Chinese  Hours: Mon - Thu 8:30 AM - 4:30 PM , Sat - Sun 9:00 AM - 12:00 PM  Fees: Free   Phone: (649) 420-9670 Email: info@[employer] Website: https://[employer] Twist.[employer]Medics/     Housing search assistance  7  [facility] - Homeless Resources and Housing Information - Housing search assistance Distance: 5.6 miles      In-Person, Phone/Virtual   [address] [place] [address]  Language: English  Hours: Mon - Fri 8:00 AM - 4:30 PM  Fees: Free   Phone: (890) 342-1439 Email: "[employer]"comAgensysyanLore@[facility]. Website: https://www.[facility]/Facilities/Facility/Details/23     8  [facility] - Homeless Resources and Housing Information - Housing search assistance Distance: 13.4 miles      In-Person, Phone/Virtual   [address] [place] [address]  Language: English  Hours: Mon - Fri 8:00 AM - 4:30 PM  Fees: Free   Phone: (530) 402-9732 Email: Tao SalesjamesQuadro Dynamics@[facility]. Website: https://www.[facility]./469/Community-Services     Shelter for families  9   Distance: 1.1 miles      In-Person   [address] [place] [address]  Language: English  Hours: Mon - Fri 3:00 PM - 9:00 AM , Sat - Sun Open 24 Hours  Fees: Free   Phone: (917) 657-6928 Ext.1 Website: https://www.saintSpins.FMLouisas.org/2020/07/03/emergency-family-shelter/     10  [facility] Distance: 23.18 miles      In-Person   [address] [place] [address]  Language: English  Hours: Mon - Sun Open 24 Hours  Fees: Free, Self Pay   Phone: (123) 592-9468 Email: Maxine@[facility].salvationarmy.org Website: http://www.sagraceplace.org/     Shelter for individuals  11  [facility] - Homeless Resources and Housing Information -  Emergency housing Distance: 5.6 miles      In-Person, Phone/Virtual   [address] [place] [address]  Language: English  Hours: Mon - Fri 8:00 AM - 4:30 PM  Fees: Free   Phone: (500) 657-4995 Email: cahcomavrilyanjose rafael@[facility] [facility]. Website: https://www.[facility]/Facilities/Facility/Details/23 12  [facility] - Homeless Resources and Housing Information - Emergency housing Distance: 13.4 miles      In-Person, Phone/Virtual   [address], [place] [address]  Language: English  Hours: Mon - Fri 8:00 AM - 4:30 PM  Fees: Free   Phone: (392) 350-5899 Email: jaswantjose rafael@[facility]. Website: https://www.[facility]/469/Community-Services          Important Numbers & Websites       Emergency Services   911  [facility]   311  Poison Control   (556) 247-5034  Suicide Prevention Lifeline   (347) 921-1029 (TALK)  Child Abuse Hotline   (745) 963-8405 (4-A-Child)  Sexual Assault Hotline   (186) 135-6701 (HOPE)  National Runaway Safeline   (767) 605-2019 (RUNAWAY)  All-Options Talkline   (865) 214-7233  Substance Abuse Referral   (973) 106-1294 (HELP)

## 2024-01-03 NOTE — COMMUNITY RESOURCES LIST (ENGLISH)
01/03/2024   [facility]  N/A  For questions about this resource list or additional care needs, please contact your primary care clinic or care manager.  Phone: [phone]   Email: N/A   Address: [facility] [place] [address]   Hours: N/A        Hotlines and Helplines       Hotline - Housing crisis  1  [facility] Distance: 18.33 miles      Phone/Virtual   [address] [place] [address]  Language: English  Hours: Mon - Sun Open 24 Hours   Phone: (312) 948-3070 Email: communications@[facility].org Website: https://oscs-mn.org/oursaviourshousing/     2  [facility] Distance: 19.52 miles      Phone/Virtual   [address] [place] [address]  Language: English  Hours: Mon - Sun Open 24 Hours   Phone: (321) 303-3663 Email: info@[facility] Website: http://www.[facility].org          Housing       Coordinated Entry access point  3  [facility] [employer] [facility] Distance: 11.09 miles      Phone/Virtual   [address] [address]  Language: English  Hours: Mon - Fri 8:30 AM - 12:00 PM , Mon - Fri 1:00 PM - 4:00 PM  Fees: Free   Phone: (449) 617-7126 Ext.2 Email: deonte@[facility].[employer].org Website: https://www.D[employer] Bio Fuelsusa.org/usn/     4  [facility] Distance: 15.33 miles      In-Person, Phone/Virtual   [address] [place] [address]  Language: English  Hours: Mon - Fri 8:30 AM - 4:30 PM  Fees: Free   Phone: (888) 514-9185 Email: info@mnArmune [employer]Science.org Website: https://www.[facility].org/[facility]/     Drop-in center or day shelter  5  [facility] Distance: 14.2 miles      In-Person   [address] [place] [address]  Language: English  Hours: Mon - Fri 9:00 AM - 4:00 PM  Fees: Free   Phone: (496) 471-8982 Email: fede@[employer].org Website: http://[employer].org     6  [facility]  Distance: 17.88 miles      In-Person   [address] [place] [address]  Language: English, Hmong, Faroese, Mohawk  Hours: Mon - Thu 8:30 AM - 4:30 PM , Sat - Sun 9:00 AM - 12:00 PM  Fees: Free   Phone: (835) 703-9559 Email: info@[employer] Website: https://evOLED.Adomo/     Housing search assistance  7  [facility] - Homeless Resources and Housing Information - Housing search assistance Distance: 5.6 miles      In-Person, Phone/Virtual   [address] [place] [address]  Language: English  Hours: Mon - Fri 8:00 AM - 4:30 PM  Fees: Free   Phone: (332) 795-9794 Email: TrabajoPanelcomStyleHaulyanMePIN / [employer]@[facility]. Website: https://www.[facility]/Facilities/Facility/Details/23     8  [facility] - Homeless Resources and Housing Information - Housing search assistance Distance: 13.4 miles      In-Person, Phone/Virtual   [address] [place] [address]  Language: English  Hours: Mon - Fri 8:00 AM - 4:30 PM  Fees: Free   Phone: (805) 180-3157 Email: BriligjamesPlaxica@[facility]. Website: https://www.[facility]./469/Community-Services     Shelter for families  9  [facility] Distance: 1.1 miles      In-Person   [address] [place] [address]  Language: English  Hours: Mon - Fri 3:00 PM - 9:00 AM , Sat - Sun Open 24 Hours  Fees: Free   Phone: (222) 265-2701 Ext.1 Website: https://www.saintCEINTHoosick Fallss.org/2020/07/03/emergency-family-shelter/     10  [facility] Distance: 23.18 miles      In-Person   [address] [place] [address]  Language: English  Hours: Mon - Sun Open 24 Hours  Fees: Free, Self Pay   Phone: (219) 876-7759 Email: Maxine@[facility].salvationarmy.org Website: http://www.sagraceplace.org/     Shelter for individuals  11  [facility] - Homeless Resources and Housing Information -  Emergency housing Distance: 5.6 miles      In-Person, Phone/Virtual   [address] [place] [address]  Language: English  Hours: Mon - Fri 8:00 AM - 4:30 PM  Fees: Free   Phone: (859) 671-6860 Email: chacomavrilyanjose rafael@co[facility]. Website: https://www.[facility]/Facilities/Facility/Details/23 12  [facility] - Homeless Resources and Housing Information - Emergency housing Distance: 13.4 miles      In-Person, Phone/Virtual   [address], [place] [address]  Language: English  Hours: Mon - Fri 8:00 AM - 4:30 PM  Fees: Free   Phone: (428) 852-2213 Email: jaswantjose rafael@[facility]. Website: https://www.[facility]/469/Community-Services          Important Numbers & Websites       Emergency Services   911  [facility]   311  Poison Control   (389) 264-6390  Suicide Prevention Lifeline   (112) 365-5625 (TALK)  Child Abuse Hotline   (617) 578-9154 (4-A-Child)  Sexual Assault Hotline   (152) 146-3964 (HOPE)  National Runaway Safeline   (103) 733-5788 (RUNAWAY)  All-Options Talkline   (413) 972-8471  Substance Abuse Referral   (598) 653-1308 (HELP)

## 2024-01-04 ENCOUNTER — OFFICE VISIT (OUTPATIENT)
Dept: FAMILY MEDICINE | Facility: CLINIC | Age: 44
End: 2024-01-04
Payer: COMMERCIAL

## 2024-01-04 VITALS
HEART RATE: 88 BPM | HEIGHT: 76 IN | DIASTOLIC BLOOD PRESSURE: 82 MMHG | OXYGEN SATURATION: 94 % | TEMPERATURE: 97.6 F | SYSTOLIC BLOOD PRESSURE: 120 MMHG | RESPIRATION RATE: 16 BRPM | WEIGHT: 248.4 LBS | BODY MASS INDEX: 30.25 KG/M2

## 2024-01-04 DIAGNOSIS — Z30.2 ENCOUNTER FOR STERILIZATION: Primary | ICD-10-CM

## 2024-01-04 PROCEDURE — 55250 REMOVAL OF SPERM DUCT(S): CPT | Performed by: FAMILY MEDICINE

## 2024-01-04 ASSESSMENT — PAIN SCALES - GENERAL: PAINLEVEL: NO PAIN (0)

## 2024-01-04 NOTE — PATIENT INSTRUCTIONS
POSTOP VASECTOMY INSTRUCTIONS    There is usually very little discomfort or risk associated with the vasectomy, but you can expect some soreness after the operation.  The less work you do over the 36 hours after the procedure, the less chance that you will have more than mild to moderate discomfort.  it is best to lie quietly for the entire day after the surgery.    There are potential risks to this procedure, as with any procedure, but again, these are very rare.  You may get some bleeding under the skin with black and blue marks occurring.  Using ice bags to the scrotum once you go home greatly decreases the chance of developing this bruising.  Even so, this is usually minimal if it does occur.  It is possible to get an infection from the surgery and it is important to keep the area clean and dry for one week after the procedure allowing yourself only to shower and not bathe.  If you notice that you are getting a lot of swelling or bleeding or soreness, it is important that you talk to your physician or the physician on call immediately.    It is important that you avoid any athletic exertion, hard labor, long car rides and sexual activity for at least a few days afterward.    You may take Tylenol, Advil or similar analgesic for pain.  If you have skin sutures, these should dissolve and fall out spontaneously in 2-3 weeks.  During that time, you may have a little bit of black and blue discoloration around the incision and some local swelling.    FINALLY, it is VERY IMPORTANT that you furnish 2 semen specimens for microscopic examination - the first at 8-12 weeks, and the second at 12 months after the surgery.  The following instructions would apply to collection of the specimen:    Collect your semen in the morning directly into the container supplied by us.  Bring it into the office within 3-4 hours after ejaculation.    The sperm sample must be kept warm.    Sperm can be collected by either interrupting  "intercourse or by masturbation.    You may \"milk\" the specimen into the container from a condom which does not contain a spermicide.    Continue to use precautions against pregnancy until you have been informed that the semen exam failed to  show any sperm.  "

## 2024-01-04 NOTE — PROGRESS NOTES
Bryan Castrejon is a 43 year old male here for vasectomy.     He has been in previously for vasectomy consult in which we discussed the no-scapel procedure, including risks and benefits, and other options of birth control.  All questions have been answered and the consent form is signed.         Pause for cause wqas performed with correct identification of patient and procedure.   Procedure:  He was placed in the supine position on the procedure table.  He was prepped and draped in the usual sterile fashion.  The vas was identified and brought up to the surface.  The skin overlying the vas was anesthetized with lidocaine and further lidocaine injected into the vas sheath.  The no-scaple vas clamp was used to grasp the vas and the dissecting instrument was used to puncture the skin and dissect out the vas free of tissue. A segment of the vas was removed, both ends were burned and closed with intraluminal electrocautery  The distal end was buried under fascia.  The procedure was repeated for the other vas.  There was no complications.  He tolerated the procedure well.  The scrotum was cleaned free of betadine and bacitracin ointment was applied over the skin incisions.         A:  Vasectomy         P:  He was given post-vasectomy instructions, including   containers for post vas specimens.  He should apply ice   and wear support for the next 2-3 days.  He should  abstain from sexual intercourse and any heavy lifting for the next week.  Call or return to clinic for any  problems.  The excised specimens were sent for pathology.  He may use Ibuprofen 800 mg po TID prn for  pain and was given a prescription for percocet.  He is not  considered sterile until follow up vas specimens are free of semen.

## 2024-01-10 ENCOUNTER — OFFICE VISIT (OUTPATIENT)
Dept: FAMILY MEDICINE | Facility: CLINIC | Age: 44
End: 2024-01-10
Payer: COMMERCIAL

## 2024-01-10 VITALS
DIASTOLIC BLOOD PRESSURE: 84 MMHG | BODY MASS INDEX: 30.72 KG/M2 | SYSTOLIC BLOOD PRESSURE: 126 MMHG | HEART RATE: 73 BPM | RESPIRATION RATE: 14 BRPM | OXYGEN SATURATION: 95 % | WEIGHT: 252.4 LBS | TEMPERATURE: 98.2 F

## 2024-01-10 DIAGNOSIS — G47.33 OSA (OBSTRUCTIVE SLEEP APNEA): ICD-10-CM

## 2024-01-10 DIAGNOSIS — Z00.00 ROUTINE GENERAL MEDICAL EXAMINATION AT A HEALTH CARE FACILITY: Primary | ICD-10-CM

## 2024-01-10 DIAGNOSIS — K62.5 BRBPR (BRIGHT RED BLOOD PER RECTUM): ICD-10-CM

## 2024-01-10 DIAGNOSIS — Z83.49 FH: THYROID DISEASE: ICD-10-CM

## 2024-01-10 PROCEDURE — 90480 ADMN SARSCOV2 VAC 1/ONLY CMP: CPT | Performed by: FAMILY MEDICINE

## 2024-01-10 PROCEDURE — 90471 IMMUNIZATION ADMIN: CPT | Performed by: FAMILY MEDICINE

## 2024-01-10 PROCEDURE — 99396 PREV VISIT EST AGE 40-64: CPT | Mod: 24 | Performed by: FAMILY MEDICINE

## 2024-01-10 PROCEDURE — 90686 IIV4 VACC NO PRSV 0.5 ML IM: CPT | Performed by: FAMILY MEDICINE

## 2024-01-10 PROCEDURE — 91320 SARSCV2 VAC 30MCG TRS-SUC IM: CPT | Performed by: FAMILY MEDICINE

## 2024-01-10 ASSESSMENT — ENCOUNTER SYMPTOMS
NAUSEA: 0
CONSTIPATION: 0
ARTHRALGIAS: 0
ABDOMINAL PAIN: 0
WEAKNESS: 0
CHILLS: 0
HEARTBURN: 0
DYSURIA: 0
FREQUENCY: 0
DIZZINESS: 0
MYALGIAS: 0
PARESTHESIAS: 0
EYE PAIN: 0
NERVOUS/ANXIOUS: 0
HEMATOCHEZIA: 0
JOINT SWELLING: 0
DIARRHEA: 0
PALPITATIONS: 0
SORE THROAT: 0
FEVER: 0
COUGH: 0
HEADACHES: 0
SHORTNESS OF BREATH: 0
HEMATURIA: 0

## 2024-01-10 ASSESSMENT — PAIN SCALES - GENERAL: PAINLEVEL: NO PAIN (0)

## 2024-01-10 NOTE — PROGRESS NOTES
SUBJECTIVE:   Bryan is a 43 year old, presenting for the following:  Physical        1/10/2024     3:06 PM   Additional Questions   Roomed by Monserrat Redmond CMA     Healthy Habits:     Getting at least 3 servings of Calcium per day:  Yes    Bi-annual eye exam:  NO    Dental care twice a year:  NO    Sleep apnea or symptoms of sleep apnea:  Sleep apnea    Diet:  Regular (no restrictions)    Frequency of exercise:  None    Taking medications regularly:  Yes    Medication side effects:  None    Additional concerns today:  Yes    -He does want to get his thyroid levels checked. His dad has graves disease.    -He says he feels like his stomach growls really loud at night. And sometimes when he eats a meal he has to go to the bathroom right away. He says it is hit or miss.    -Over a year ago he did have an incident whee he got really severe chest pain where it stopped him in his tracks. Has not happened since.    -He does have a lump that comes and goes in his stomach. Concerned about possible hernia.        Today's PHQ-2 Score:       1/10/2024     1:47 PM   PHQ-2 (  [employer])   Q1: Little interest or pleasure in doing things 0   Q2: Feeling down, depressed or hopeless 0   PHQ-2 Score 0   Q1: Little interest or pleasure in doing things Not at all   Q2: Feeling down, depressed or hopeless Not at all   PHQ-2 Score 0     Have you ever done Advance Care Planning? (For example, a Health Directive, POLST, or a discussion with a medical provider or your loved ones about your wishes): No, advance care planning information given to patient to review.  Patient declined advance care planning discussion at this time.    Social History     Tobacco Use    Smoking status: Former     Types: Cigarettes     Quit date: 2010     Years since quittin.6    Smokeless tobacco: Never   Substance Use Topics    Alcohol use: Yes     Comment: occasional             1/10/2024     1:47 PM   Alcohol Use   Prescreen: >3 drinks/day or >7  "drinks/week? Yes   AUDIT SCORE  9         1/10/2024     1:47 PM   AUDIT - Alcohol Use Disorders Identification Test - Reproduced from the World Health Organization Audit 2001 (Second Edition)   1.  How often do you have a drink containing alcohol? 2 to 3 times a week   2.  How many drinks containing alcohol do you have on a typical day when you are drinking? 7 to 9   3.  How often do you have five or more drinks on one occasion? Weekly   4.  How often during the last year have you found that you were not able to stop drinking once you had started? Never   5.  How often during the last year have you failed to do what was normally expected of you because of drinking? Never   6.  How often during the last year have you needed a first drink in the morning to get yourself going after a heavy drinking session? Never   7.  How often during the last year have you had a feeling of guilt or remorse after drinking? Never   8.  How often during the last year have you been unable to remember what happened the night before because of your drinking? Never   9.  Have you or someone else been injured because of your drinking? No   10. Has a relative, friend, doctor or other health care worker been concerned about your drinking or suggested you cut down? No   TOTAL SCORE 9       Last PSA: No results found for: \"PSA\"    Reviewed orders with patient. Reviewed health maintenance and updated orders accordingly - Yes    Reviewed and updated as needed this visit by clinical staff   Tobacco  Allergies  Meds   Med Hx  Surg Hx  Fam Hx  Soc Hx        Reviewed and updated as needed this visit by Provider                   Review of Systems   Constitutional:  Negative for chills and fever.   HENT:  Negative for congestion, ear pain, hearing loss and sore throat.    Eyes:  Negative for pain and visual disturbance.   Respiratory:  Negative for cough and shortness of breath.    Cardiovascular:  Negative for chest pain, palpitations and " peripheral edema.   Gastrointestinal:  Negative for abdominal pain, constipation, diarrhea, heartburn, hematochezia and nausea.   Genitourinary:  Negative for dysuria, frequency, genital sores, hematuria, impotence, penile discharge and urgency.   Musculoskeletal:  Negative for arthralgias, joint swelling and myalgias.   Skin:  Negative for rash.   Neurological:  Negative for dizziness, weakness, headaches and paresthesias.   Psychiatric/Behavioral:  Negative for mood changes. The patient is not nervous/anxious.      CONSTITUTIONAL: NEGATIVE for fever, chills, change in weight  INTEGUMENTARY/SKIN: NEGATIVE for worrisome rashes, moles or lesions  EYES: NEGATIVE for vision changes or irritation  ENT: NEGATIVE for ear, mouth and throat problems  RESP: NEGATIVE for significant cough or SOB  CV: NEGATIVE for chest pain, palpitations or peripheral edema  GI: NEGATIVE for nausea, abdominal pain, heartburn, or change in bowel habits   male: negative for dysuria, hematuria, decreased urinary stream, erectile dysfunction, urethral discharge  MUSCULOSKELETAL: NEGATIVE for significant arthralgias or myalgia  NEURO: NEGATIVE for weakness, dizziness or paresthesias  PSYCHIATRIC: NEGATIVE for changes in mood or affect    OBJECTIVE:   /84   Pulse 73   Temp 98.2  F (36.8  C) (Tympanic)   Resp 14   Wt 114.5 kg (252 lb 6.4 oz)   SpO2 95%   BMI 30.72 kg/m      Physical Exam  GENERAL: healthy, alert and no distress  NECK: no adenopathy, no asymmetry, masses, or scars and thyroid normal to palpation  RESP: lungs clear to auscultation - no rales, rhonchi or wheezes  CV: regular rate and rhythm, normal S1 S2, no S3 or S4, no murmur, click or rub, no peripheral edema and peripheral pulses strong  ABDOMEN: soft, nontender, no hepatosplenomegaly, no masses and bowel sounds normal  MS: no gross musculoskeletal defects noted, no edema    Diagnostic Test Results:  Labs reviewed in Epic    ASSESSMENT/PLAN:   (Z00.00) Routine general  "medical examination at a health care facility  (primary encounter diagnosis)      (G47.33) DIAMOND (obstructive sleep apnea)-moderate (AHI 24)      Patient has been advised of split billing requirements and indicates understanding: Yes      COUNSELING:   Reviewed preventive health counseling, as reflected in patient instructions       Regular exercise       Healthy diet/nutrition       Vision screening       Hearing screening      BMI:   Estimated body mass index is 30.72 kg/m  as calculated from the following:    Height as of 1/4/24: 1.93 m (6' 4\").    Weight as of this encounter: 114.5 kg (252 lb 6.4 oz).   Weight management plan: Discussed healthy diet and exercise guidelines      He reports that he quit smoking about 13 years ago. He has never used smokeless tobacco.            Crow Collier MD  [facility]  "

## 2024-01-25 ENCOUNTER — MYC REFILL (OUTPATIENT)
Dept: FAMILY MEDICINE | Facility: CLINIC | Age: 44
End: 2024-01-25
Payer: COMMERCIAL

## 2024-01-25 DIAGNOSIS — G47.33 OSA (OBSTRUCTIVE SLEEP APNEA): Primary | ICD-10-CM

## 2024-02-01 ENCOUNTER — DOCUMENTATION ONLY (OUTPATIENT)
Dept: SLEEP MEDICINE | Facility: CLINIC | Age: 44
End: 2024-02-01
Payer: COMMERCIAL

## 2024-02-01 DIAGNOSIS — G47.33 OBSTRUCTIVE SLEEP APNEA (ADULT) (PEDIATRIC): Primary | ICD-10-CM

## 2024-02-01 NOTE — PROGRESS NOTES
Patient was offered choice of vendor and chose [facility].  Patient Bryan Castrejon was set up at [place]  on February 1, 2024. Patient received a Resmed Airsense 10 Pressures were set at  6-15 cm H2O.   Patient s ramp is 5 cm H2O for Auto and FLEX/EPR is EPR, 2.  Patient received a Resmed Mask name: RIVERA FX  Pillow mask size Standard, heated tubing and heated humidifier.  Patient has the following compliance requirements: usage only   Alec Nobles

## 2024-04-05 ENCOUNTER — LAB (OUTPATIENT)
Dept: LAB | Facility: CLINIC | Age: 44
End: 2024-04-05
Payer: COMMERCIAL

## 2024-04-05 DIAGNOSIS — Z83.49 FH: THYROID DISEASE: ICD-10-CM

## 2024-04-05 DIAGNOSIS — Z30.2 ENCOUNTER FOR STERILIZATION: ICD-10-CM

## 2024-04-05 DIAGNOSIS — K62.5 BRBPR (BRIGHT RED BLOOD PER RECTUM): ICD-10-CM

## 2024-04-05 LAB
HGB BLD-MCNC: 15.1 G/DL (ref 13.3–17.7)
SEMEN ANALYSIS P VAS PNL: NORMAL
SPERM MOTILE SMN QL MICRO: NORMAL
TSH SERPL DL<=0.005 MIU/L-ACNC: 1.61 UIU/ML (ref 0.3–4.2)

## 2024-04-05 PROCEDURE — 85018 HEMOGLOBIN: CPT

## 2024-04-05 PROCEDURE — 36415 COLL VENOUS BLD VENIPUNCTURE: CPT

## 2024-04-05 PROCEDURE — 84443 ASSAY THYROID STIM HORMONE: CPT

## 2024-04-05 PROCEDURE — 89321 SEMEN ANAL SPERM DETECTION: CPT

## 2024-08-13 ENCOUNTER — LAB (OUTPATIENT)
Dept: LAB | Facility: CLINIC | Age: 44
End: 2024-08-13
Payer: COMMERCIAL

## 2024-08-13 DIAGNOSIS — Z30.2 ENCOUNTER FOR STERILIZATION: ICD-10-CM

## 2024-08-13 LAB — SEMEN ANALYSIS P VAS PNL: NORMAL

## 2024-08-13 PROCEDURE — 89321 SEMEN ANAL SPERM DETECTION: CPT

## 2024-12-11 ENCOUNTER — PATIENT OUTREACH (OUTPATIENT)
Dept: CARE COORDINATION | Facility: CLINIC | Age: 44
End: 2024-12-11
Payer: COMMERCIAL

## 2024-12-25 ENCOUNTER — PATIENT OUTREACH (OUTPATIENT)
Dept: CARE COORDINATION | Facility: CLINIC | Age: 44
End: 2024-12-25
Payer: COMMERCIAL

## 2025-03-09 ENCOUNTER — HEALTH MAINTENANCE LETTER (OUTPATIENT)
Age: 45
End: 2025-03-09

## 2025-05-29 SDOH — HEALTH STABILITY: PHYSICAL HEALTH: ON AVERAGE, HOW MANY DAYS PER WEEK DO YOU ENGAGE IN MODERATE TO STRENUOUS EXERCISE (LIKE A BRISK WALK)?: 3 DAYS

## 2025-05-29 SDOH — HEALTH STABILITY: PHYSICAL HEALTH: ON AVERAGE, HOW MANY MINUTES DO YOU ENGAGE IN EXERCISE AT THIS LEVEL?: 60 MIN

## 2025-05-29 ASSESSMENT — SOCIAL DETERMINANTS OF HEALTH (SDOH): HOW OFTEN DO YOU GET TOGETHER WITH FRIENDS OR RELATIVES?: THREE TIMES A WEEK

## 2025-06-02 ENCOUNTER — OFFICE VISIT (OUTPATIENT)
Dept: FAMILY MEDICINE | Facility: CLINIC | Age: 45
End: 2025-06-02
Payer: COMMERCIAL

## 2025-06-02 VITALS
HEIGHT: 76 IN | WEIGHT: 247.8 LBS | HEART RATE: 71 BPM | OXYGEN SATURATION: 97 % | RESPIRATION RATE: 16 BRPM | BODY MASS INDEX: 30.18 KG/M2 | DIASTOLIC BLOOD PRESSURE: 84 MMHG | TEMPERATURE: 98.4 F | SYSTOLIC BLOOD PRESSURE: 122 MMHG

## 2025-06-02 DIAGNOSIS — M54.50 ACUTE BILATERAL LOW BACK PAIN WITHOUT SCIATICA: ICD-10-CM

## 2025-06-02 DIAGNOSIS — Z11.4 SCREENING FOR HIV (HUMAN IMMUNODEFICIENCY VIRUS): ICD-10-CM

## 2025-06-02 DIAGNOSIS — Z13.6 SCREENING FOR CARDIOVASCULAR CONDITION: ICD-10-CM

## 2025-06-02 DIAGNOSIS — Z11.59 NEED FOR HEPATITIS C SCREENING TEST: ICD-10-CM

## 2025-06-02 DIAGNOSIS — Z00.00 ROUTINE GENERAL MEDICAL EXAMINATION AT A HEALTH CARE FACILITY: ICD-10-CM

## 2025-06-02 DIAGNOSIS — Z13.1 SCREENING FOR DIABETES MELLITUS: Primary | ICD-10-CM

## 2025-06-02 LAB
FASTING STATUS PATIENT QL REPORTED: NO
GLUCOSE SERPL-MCNC: 132 MG/DL (ref 70–99)

## 2025-06-02 PROCEDURE — 36415 COLL VENOUS BLD VENIPUNCTURE: CPT | Performed by: FAMILY MEDICINE

## 2025-06-02 PROCEDURE — 3079F DIAST BP 80-89 MM HG: CPT | Performed by: FAMILY MEDICINE

## 2025-06-02 PROCEDURE — 83721 ASSAY OF BLOOD LIPOPROTEIN: CPT | Performed by: FAMILY MEDICINE

## 2025-06-02 PROCEDURE — 1126F AMNT PAIN NOTED NONE PRSNT: CPT | Performed by: FAMILY MEDICINE

## 2025-06-02 PROCEDURE — 82947 ASSAY GLUCOSE BLOOD QUANT: CPT | Performed by: FAMILY MEDICINE

## 2025-06-02 PROCEDURE — 99213 OFFICE O/P EST LOW 20 MIN: CPT | Mod: 25 | Performed by: FAMILY MEDICINE

## 2025-06-02 PROCEDURE — 99396 PREV VISIT EST AGE 40-64: CPT | Performed by: FAMILY MEDICINE

## 2025-06-02 PROCEDURE — 3074F SYST BP LT 130 MM HG: CPT | Performed by: FAMILY MEDICINE

## 2025-06-02 RX ORDER — CYCLOBENZAPRINE HCL 10 MG
10 TABLET ORAL 3 TIMES DAILY PRN
Qty: 24 TABLET | Refills: 0 | Status: SHIPPED | OUTPATIENT
Start: 2025-06-02

## 2025-06-02 ASSESSMENT — PAIN SCALES - GENERAL: PAINLEVEL_OUTOF10: NO PAIN (0)

## 2025-06-02 NOTE — PROGRESS NOTES
"Preventive Care Visit  [facility] RAMON Collier MD, Family Medicine  Jun 2, 2025      Assessment & Plan     Screening for diabetes mellitus  - Glucose; Future      Screening for cardiovascular condition  - LDL cholesterol direct; Future    Routine general medical examination at a health care facility    Acute bilateral low back pain without sciatica  - cyclobenzaprine (FLEXERIL) 10 MG tablet; Take 1 tablet (10 mg) by mouth 3 times daily as needed for muscle spasms.    Patient has been advised of split billing requirements and indicates understanding: Yes        BMI  Estimated body mass index is 30.16 kg/m  as calculated from the following:    Height as of this encounter: 1.93 m (6' 4\").    Weight as of this encounter: 112.4 kg (247 lb 12.8 oz).   Weight management plan: Discussed healthy diet and exercise guidelines    Counseling  Appropriate preventive services were addressed with this patient via screening, questionnaire, or discussion as appropriate for fall prevention, nutrition, physical activity, Tobacco-use cessation, social engagement, weight loss and cognition.  Checklist reviewing preventive services available has been given to the patient.  Reviewed patient's diet, addressing concerns and/or questions.   He is at risk for lack of exercise and has been provided with information to increase physical activity for the benefit of his well-being.   The patient was instructed to see the dentist every 6 months.   The patient reports drinking more than 3 alcoholic drinks per day and/or more than 7 drhnks per week. The patient was counseled and given information about possible harmful effects of excessive alcohol intake.      Rosalva Adams is a 45 year old, presenting for the following:  Physical        6/2/2025    12:52 PM   Additional Questions   Roomed by Monserrat Carey CMA   Accompanied by Self          HPI    -Has been having a lot more lower back pain.      Advance Care " Planning    Discussed advance care planning with patient; however, patient declined at this time.        5/29/2025   General Health   How would you rate your overall physical health? Good   Feel stress (tense, anxious, or unable to sleep) Not at all         5/29/2025   Nutrition   Three or more servings of calcium each day? Yes   Diet: Regular (no restrictions)   How many servings of fruit and vegetables per day? (!) 0-1   How many sweetened beverages each day? (!) 2         5/29/2025   Exercise   Days per week of moderate/strenous exercise 3 days   Average minutes spent exercising at this level 60 min         5/29/2025   Social Factors   Frequency of gathering with friends or relatives Three times a week   Worry food won't last until get money to buy more No   Food not last or not have enough money for food? No   Do you have housing? (Housing is defined as stable permanent housing and does not include staying outside in a car, in a tent, in an abandoned building, in an overnight shelter, or couch-surfing.) No   Are you worried about losing your housing? No   Lack of transportation? No   Unable to get utilities (heat,electricity)? No   Want help with housing or utility concern? No   (!) HOUSING CONCERN PRESENT      5/29/2025   Dental   Dentist two times every year? (!) NO         Today's PHQ-2 Score:       6/2/2025    12:42 PM   PHQ-2 ( 1999 [employer])   Q1: Little interest or pleasure in doing things 0   Q2: Feeling down, depressed or hopeless 0   PHQ-2 Score 0    Q1: Little interest or pleasure in doing things Not at all   Q2: Feeling down, depressed or hopeless Not at all   PHQ-2 Score 0       Patient-reported           5/29/2025   Substance Use   Alcohol more than 3/day or more than 7/wk Yes   How often do you have a drink containing alcohol 4 or more times a week   How many alcohol drinks on typical day 5 or 6   How often do you have 5+ drinks at one occasion Weekly   Audit 2/3 Score 5   How often not able to stop  "drinking once started Never   How often failed to do what normally expected Never   How often needed first drink in am after a heavy drinking session Never   How often feeling of guilt or remorse after drinking Never   How often unable to remember what happened the night before Never   Have you or someone else been injured because of your drinking No   Has anyone been concerned or suggested you cut down on drinking No   TOTAL SCORE - AUDIT 9   Do you use any other substances recreationally? No     Social History     Tobacco Use    Smoking status: Former     Current packs/day: 0.00     Types: Cigarettes     Quit date: 6/1/2010     Years since quitting: 15.0    Smokeless tobacco: Never   Substance Use Topics    Alcohol use: Yes     Comment: occasional    Drug use: No             5/29/2025   One time HIV Screening   Previous HIV test? Yes         5/29/2025   STI Screening   New sexual partner(s) since last STI/HIV test? No   ASCVD Risk   The ASCVD Risk score (Quyen NOVAK, et al., 2019) failed to calculate for the following reasons:    Cannot find a previous HDL lab    Cannot find a previous total cholesterol lab        5/29/2025   Contraception/Family Planning   Questions about contraception or family planning No        Reviewed and updated as needed this visit by Provider                        Review of Systems  Constitutional, HEENT, cardiovascular, pulmonary, gi and gu systems are negative, except as otherwise noted.     Objective    Exam  /84 (BP Location: Right arm, Patient Position: Sitting, Cuff Size: Adult Regular)   Pulse 71   Temp 98.4  F (36.9  C) (Tympanic)   Resp 16   Ht 1.93 m (6' 4\")   Wt 112.4 kg (247 lb 12.8 oz)   SpO2 97%   BMI 30.16 kg/m     Estimated body mass index is 30.16 kg/m  as calculated from the following:    Height as of this encounter: 1.93 m (6' 4\").    Weight as of this encounter: 112.4 kg (247 lb 12.8 oz).    Physical Exam  GENERAL: alert and no distress  NECK: no " adenopathy, no asymmetry, masses, or scars  RESP: lungs clear to auscultation - no rales, rhonchi or wheezes  CV: regular rate and rhythm, normal S1 S2, no S3 or S4, no murmur, click or rub, no peripheral edema  ABDOMEN: soft, nontender, no hepatosplenomegaly, no masses and bowel sounds normal  MS: no gross musculoskeletal defects noted, no edema        Signed Electronically by: Crow Collier MD

## 2025-06-02 NOTE — PATIENT INSTRUCTIONS
Patient Education   Preventive Care Advice   This is general advice given by our system to help you stay healthy. However, your care team may have specific advice just for you. Please talk to your care team about your preventive care needs.  Nutrition  Eat 5 or more servings of fruits and vegetables each day.  Try wheat bread, brown rice and whole grain pasta (instead of white bread, rice, and pasta).  Get enough calcium and vitamin D. Check the label on foods and aim for 100% of the RDA (recommended daily allowance).  Lifestyle  Exercise at least 150 minutes each week  (30 minutes a day, 5 days a week).  Do muscle strengthening activities 2 days a week. These help control your weight and prevent disease.  No smoking.  Wear sunscreen to prevent skin cancer.  Have a dental exam and cleaning every 6 months.  Yearly exams  See your health care team every year to talk about:  Any changes in your health.  Any medicines your care team has prescribed.  Preventive care, family planning, and ways to prevent chronic diseases.  Shots (vaccines)   HPV shots (up to age 26), if you've never had them before.  Hepatitis B shots (up to age 59), if you've never had them before.  COVID-19 shot: Get this shot when it's due.  Flu shot: Get a flu shot every year.  Tetanus shot: Get a tetanus shot every 10 years.  Pneumococcal, hepatitis A, and RSV shots: Ask your care team if you need these based on your risk.  Shingles shot (for age 50 and up)  General health tests  Diabetes screening:  Starting at age 35, Get screened for diabetes at least every 3 years.  If you are younger than age 35, ask your care team if you should be screened for diabetes.  Cholesterol test: At age 39, start having a cholesterol test every 5 years, or more often if advised.  Bone density scan (DEXA): At age 50, ask your care team if you should have this scan for osteoporosis (brittle bones).  Hepatitis C: Get tested at least once in your life.  STIs (sexually  transmitted infections)  Before age 24: Ask your care team if you should be screened for STIs.  After age 24: Get screened for STIs if you're at risk. You are at risk for STIs (including HIV) if:  You are sexually active with more than one person.  You don't use condoms every time.  You or a partner was diagnosed with a sexually transmitted infection.  If you are at risk for HIV, ask about PrEP medicine to prevent HIV.  Get tested for HIV at least once in your life, whether you are at risk for HIV or not.  Cancer screening tests  Cervical cancer screening: If you have a cervix, begin getting regular cervical cancer screening tests starting at age 21.  Breast cancer scan (mammogram): If you've ever had breasts, begin having regular mammograms starting at age 40. This is a scan to check for breast cancer.  Colon cancer screening: It is important to start screening for colon cancer at age 45.  Have a colonoscopy test every 10 years (or more often if you're at risk) Or, ask your provider about stool tests like a FIT test every year or Cologuard test every 3 years.  To learn more about your testing options, visit:   .  For help making a decision, visit:   https://bit.ly/zp96007.  Prostate cancer screening test: If you have a prostate, ask your care team if a prostate cancer screening test (PSA) at age 55 is right for you.  Lung cancer screening: If you are a current or former smoker ages 50 to 80, ask your care team if ongoing lung cancer screenings are right for you.  For informational purposes only. Not to replace the advice of your health care provider. Copyright   2023 [facility]. All rights reserved. Clinically reviewed by the [facility] Transitions Program. [employer] [id number] - REV 01/24.  9 Ways to Cut Back on Drinking  Maybe you've found yourself drinking more alcohol than you'd prefer. If you want to cut back, here are some ideas to try.    Think before you drink.  Do you really want a drink,  "or is it just a habit? If you're used to having a drink at a certain time, try doing something else then.     Look for substitutes.  Find some no-alcohol drinks that you enjoy, like flavored seltzer water, tea with honey, or tonic with a slice of lime. Or try alcohol-free beer or \"virgin\" cocktails (without the alcohol).     Drink more water.  Use water to quench your thirst. Drink a glass of water before you have any alcohol. Have another glass along with every drink or between drinks.     Shrink your drink.  For example, have a bottle of beer instead of a pint. Use a smaller glass for wine. Choose drinks with lower alcohol content (ABV%). Or use less liquor and more mixer in cocktails.     Slow down.  It's easy to drink quickly and without thinking about it. Pay attention, and make each drink last longer.     Do the math.  Total up how much you spend on alcohol each month. How much is that a year? If you cut back, what could you do with the money you save?     Take a break.  Choose a day or two each week when you won't drink at all. Notice how you feel on those days, physically and emotionally. How did you sleep? Do you feel better? Over time, add more break days.     Count calories.  Would you like to lose some weight? For some people that's a good motivator for cutting back. Figure out how many calories are in each drink. How many does that add up to in a day? In a week? In a month?     Practice saying no.  Be ready when someone offers you a drink. Try: \"Thanks, I've had enough.\" Or \"Thanks, but I'm cutting back.\" Or \"No, thanks. I feel better when I drink less.\"   Current as of: August 20, 2024  Content Version: 14.4 2024-2025 [employer]   Care instructions adapted under license by your healthcare professional. If you have questions about a medical condition or this instruction, always ask your healthcare professional. [employer] disclaims any warranty or liability for your use of " this information.

## 2025-06-03 LAB — LDLC SERPL DIRECT ASSAY-MCNC: 167 MG/DL

## 2025-06-05 ENCOUNTER — RESULTS FOLLOW-UP (OUTPATIENT)
Dept: FAMILY MEDICINE | Facility: CLINIC | Age: 45
End: 2025-06-05